# Patient Record
Sex: FEMALE | Race: WHITE | NOT HISPANIC OR LATINO | Employment: OTHER | URBAN - NONMETROPOLITAN AREA
[De-identification: names, ages, dates, MRNs, and addresses within clinical notes are randomized per-mention and may not be internally consistent; named-entity substitution may affect disease eponyms.]

---

## 2023-09-21 PROBLEM — Z79.01 ANTICOAGULATED: Status: ACTIVE | Noted: 2023-09-21

## 2023-09-21 PROBLEM — R53.83 FATIGUE: Status: ACTIVE | Noted: 2023-09-21

## 2023-09-21 PROBLEM — R00.2 PALPITATIONS: Status: ACTIVE | Noted: 2023-09-21

## 2023-09-21 PROBLEM — I48.91 ATRIAL FIBRILLATION WITH RVR (MULTI): Status: ACTIVE | Noted: 2023-09-21

## 2023-09-21 PROBLEM — R93.1 ECHOCARDIOGRAM ABNORMAL: Status: ACTIVE | Noted: 2023-09-21

## 2023-09-21 PROBLEM — Z79.899 HIGH RISK MEDICATIONS (NOT ANTICOAGULANTS) LONG-TERM USE: Status: ACTIVE | Noted: 2023-09-21

## 2023-09-21 PROBLEM — I10 HYPERTENSION, BENIGN: Status: ACTIVE | Noted: 2023-09-21

## 2023-09-21 PROBLEM — R06.09 DYSPNEA ON EXERTION: Status: ACTIVE | Noted: 2023-09-21

## 2023-09-21 PROBLEM — E78.2 MIXED HYPERLIPIDEMIA: Status: ACTIVE | Noted: 2023-09-21

## 2023-09-21 RX ORDER — NITROFURANTOIN MACROCRYSTALS 50 MG/1
1 CAPSULE ORAL DAILY
COMMUNITY

## 2023-09-21 RX ORDER — ROSUVASTATIN CALCIUM 10 MG/1
1 TABLET, COATED ORAL DAILY
COMMUNITY

## 2023-09-21 RX ORDER — CHOLECALCIFEROL (VITAMIN D3) 125 MCG
1 CAPSULE ORAL
COMMUNITY

## 2023-09-21 RX ORDER — LEVMETAMFETAMINE 50 MG
1 INHALER (EA) NASAL DAILY
COMMUNITY

## 2023-09-21 RX ORDER — DOFETILIDE 0.12 MG/1
1 CAPSULE ORAL 2 TIMES DAILY
COMMUNITY
Start: 2022-09-21 | End: 2024-02-14 | Stop reason: SDUPTHER

## 2023-09-21 RX ORDER — OMEPRAZOLE 20 MG/1
1 CAPSULE, DELAYED RELEASE ORAL
COMMUNITY

## 2023-09-21 RX ORDER — BUTYROSPERMUM PARKII(SHEA BUTTER), SIMMONDSIA CHINENSIS (JOJOBA) SEED OIL, ALOE BARBADENSIS LEAF EXTRACT .01; 1; 3.5 G/100G; G/100G; G/100G
250 LIQUID TOPICAL DAILY
COMMUNITY
End: 2024-02-14

## 2023-09-21 RX ORDER — ESTRADIOL 0.1 MG/G
CREAM VAGINAL
COMMUNITY

## 2023-10-20 ENCOUNTER — APPOINTMENT (OUTPATIENT)
Dept: CARDIOLOGY | Facility: CLINIC | Age: 83
End: 2023-10-20
Payer: COMMERCIAL

## 2023-12-11 DIAGNOSIS — I48.91 ATRIAL FIBRILLATION WITH RVR (MULTI): ICD-10-CM

## 2023-12-11 RX ORDER — DOFETILIDE 0.25 MG/1
1 CAPSULE ORAL EVERY 12 HOURS
COMMUNITY
End: 2023-12-11 | Stop reason: SDUPTHER

## 2023-12-11 NOTE — TELEPHONE ENCOUNTER
Patient called to request Tikosyn to [facility]. Feb visit pending.   Patient request Tikosyn 250  mcg one tablet two times daily. Chart has 125 mcg.     Old chart has 250 mcg listed. To Dr. Fred Schumacher MD for review.

## 2023-12-12 RX ORDER — DOFETILIDE 0.25 MG/1
250 CAPSULE ORAL EVERY 12 HOURS
Qty: 180 CAPSULE | Refills: 1 | Status: SHIPPED | OUTPATIENT
Start: 2023-12-12 | End: 2024-02-14 | Stop reason: DRUGHIGH

## 2024-01-30 DIAGNOSIS — I48.91 ATRIAL FIBRILLATION WITH RVR (MULTI): ICD-10-CM

## 2024-02-14 ENCOUNTER — OFFICE VISIT (OUTPATIENT)
Dept: CARDIOLOGY | Facility: CLINIC | Age: 84
End: 2024-02-14
Payer: COMMERCIAL

## 2024-02-14 VITALS
WEIGHT: 126 LBS | SYSTOLIC BLOOD PRESSURE: 160 MMHG | DIASTOLIC BLOOD PRESSURE: 68 MMHG | BODY MASS INDEX: 21.51 KG/M2 | HEIGHT: 64 IN | HEART RATE: 55 BPM

## 2024-02-14 DIAGNOSIS — Z79.899 HIGH RISK MEDICATIONS (NOT ANTICOAGULANTS) LONG-TERM USE: ICD-10-CM

## 2024-02-14 DIAGNOSIS — I48.91 ATRIAL FIBRILLATION WITH RVR (MULTI): Primary | ICD-10-CM

## 2024-02-14 DIAGNOSIS — E78.2 MIXED HYPERLIPIDEMIA: ICD-10-CM

## 2024-02-14 DIAGNOSIS — Z79.01 ANTICOAGULATED: ICD-10-CM

## 2024-02-14 DIAGNOSIS — I10 HYPERTENSION, BENIGN: ICD-10-CM

## 2024-02-14 PROBLEM — C91.10 CLL (CHRONIC LYMPHOCYTIC LEUKEMIA) (MULTI): Status: ACTIVE | Noted: 2024-02-14

## 2024-02-14 PROCEDURE — 3078F DIAST BP <80 MM HG: CPT | Performed by: INTERNAL MEDICINE

## 2024-02-14 PROCEDURE — 1159F MED LIST DOCD IN RCRD: CPT | Performed by: INTERNAL MEDICINE

## 2024-02-14 PROCEDURE — 1036F TOBACCO NON-USER: CPT | Performed by: INTERNAL MEDICINE

## 2024-02-14 PROCEDURE — 3077F SYST BP >= 140 MM HG: CPT | Performed by: INTERNAL MEDICINE

## 2024-02-14 PROCEDURE — 93000 ELECTROCARDIOGRAM COMPLETE: CPT | Performed by: INTERNAL MEDICINE

## 2024-02-14 PROCEDURE — 99214 OFFICE O/P EST MOD 30 MIN: CPT | Performed by: INTERNAL MEDICINE

## 2024-02-14 RX ORDER — DOFETILIDE 0.12 MG/1
125 CAPSULE ORAL 2 TIMES DAILY
Qty: 180 CAPSULE | Refills: 3 | Status: SHIPPED | OUTPATIENT
Start: 2024-02-14 | End: 2024-02-19

## 2024-02-14 NOTE — LETTER
"February 14, 2024     Nura Jett DO  [address]  [facility]  [facility] [id number]    Patient: Faye Rodriguez   YOB: 1940   Date of Visit: 2/14/2024       Dear Dr. Nura Jett DO:    Thank you for referring Faye Rodriguez to me for evaluation. Below are my notes for this consultation.  If you have questions, please do not hesitate to call me. I look forward to following your patient along with you.       Sincerely,     Fred Schumacher MD      CC: No Recipients  ______________________________________________________________________________________    Subjective   Faye Rodriguez is a 83 y.o. female       Chief Complaint    Follow-up          HPI     Patient returns in follow-up of problems as noted.  She is done well.  I cannot elicit any angina CHF or arrhythmia symptomatology and it appears that rhythm control is good.  She advises she is having a cochlear implant.  I advised her she could stop her Eliquis 5 days preop and resume it 5 days postop unless the surgeon felt otherwise.  From a cardiac standpoint she otherwise is doing well.  The reason and the rationale for restoring and maintaining sinus rhythm with dofetilide was reviewed with her and she agrees to continue as before.  She has no complaints or problems related to anticoagulant therapy and is tolerating other medical interventions for risk factors.  Review of her EKG to assess high risk medication dofetilide demonstrates an acceptable QT interval      EKG done in office today     Vitals:    02/14/24 1144   BP: 160/68   BP Location: Left arm   Patient Position: Sitting   Pulse: 55   Weight: 57.2 kg (126 lb)   Height: 1.626 m (5' 4\")        Objective   Physical Exam  Constitutional:       Appearance: Normal appearance. She is normal weight.   HENT:      Nose: Nose normal.   Neck:      Vascular: No carotid bruit.   Cardiovascular:      Rate and Rhythm: Normal rate.      Pulses: Normal pulses.      Heart " sounds: Normal heart sounds.   Pulmonary:      Effort: Pulmonary effort is normal.   Abdominal:      General: Bowel sounds are normal.      Palpations: Abdomen is soft.   Genitourinary:     Rectum: Normal.   Musculoskeletal:         General: Normal range of motion.      Cervical back: Normal range of motion.      Right lower leg: No edema.      Left lower leg: No edema.   Skin:     General: Skin is warm and dry.   Neurological:      General: No focal deficit present.      Mental Status: She is alert.   Psychiatric:         Mood and Affect: Mood normal.         Behavior: Behavior normal.         Thought Content: Thought content normal.         Judgment: Judgment normal.         Allergies  Codeine, Meperidine, and Penicillins     Current Medications    Current Outpatient Medications:   •  acetaminophen (TYLENOL EXTRA STRENGTH ORAL), Take by mouth. As needed, Disp: , Rfl:   •  apixaban (Eliquis) 2.5 mg tablet, Take 1 tablet (2.5 mg) by mouth 2 times a day., Disp: , Rfl:   •  cholecalciferol (Vitamin D-3) 125 MCG (5000 UT) capsule, Take 1 capsule (125 mcg) by mouth every 14 (fourteen) days., Disp: , Rfl:   •  cranberry fruit extract (cranberry extract) 500 mg tablet, Take 1 tablet by mouth once daily., Disp: , Rfl:   •  dofetilide (Tikosyn) 125 mcg capsule, Take 1 capsule (125 mcg) by mouth 2 times a day., Disp: , Rfl:   •  estradiol (Estrace) 0.01 % (0.1 mg/gram) vaginal cream, Apply 1 gram vaginally at night for 2 weeks, then use twice a week after, Disp: , Rfl:   •  nitrofurantoin (Macrodantin) 50 mg capsule, Take 1 capsule (50 mg) by mouth once daily., Disp: , Rfl:   •  omeprazole (PriLOSEC) 20 mg DR capsule, Take 1 capsule (20 mg) by mouth once daily in the morning. Take before meals. Do not crush or chew., Disp: , Rfl:   •  predniSONE 5 mg tablet,delayed release (DR/EC), Take 0.5 tablets by mouth. As directed, Disp: , Rfl:   •  rosuvastatin (Crestor) 10 mg tablet, Take 1 tablet (10 mg) by mouth once daily., Disp:  , Rfl:                      Assessment/Plan       1. Atrial fibrillation with RVR (CMS/HCC)  Restored and maintained in sinus rhythm with dofetilide.  Continue same    2. Anticoagulated  Well-tolerated with no bleeding complications.  The interruption and resumption of anticoagulant therapy around the time of cochlear implant was discussed.    3. Mixed hyperlipidemia  Review of treatment demonstrates no need for adjustments    4. Hypertension, benign  Review of treatment demonstrates no need for adjustments    5. High risk medications (not anticoagulants) long-term use  Acceptable QT interval on EKG today.      Scribe Attestation  By signing my name below, Verito SAXENA LPN , Patsy   attest that this documentation has been prepared under the direction and in the presence of Fred Schumacher MD.

## 2024-02-14 NOTE — PROGRESS NOTES
"Subjective   Faye Rodriguez is a 83 y.o. female       Chief Complaint    Follow-up          HPI     Patient returns in follow-up of problems as noted.  She is done well.  I cannot elicit any angina CHF or arrhythmia symptomatology and it appears that rhythm control is good.  She advises she is having a cochlear implant.  I advised her she could stop her Eliquis 5 days preop and resume it 5 days postop unless the surgeon felt otherwise.  From a cardiac standpoint she otherwise is doing well.  The reason and the rationale for restoring and maintaining sinus rhythm with dofetilide was reviewed with her and she agrees to continue as before.  She has no complaints or problems related to anticoagulant therapy and is tolerating other medical interventions for risk factors.  Review of her EKG to assess high risk medication dofetilide demonstrates an acceptable QT interval      EKG done in office today     Vitals:    02/14/24 1144   BP: 160/68   BP Location: Left arm   Patient Position: Sitting   Pulse: 55   Weight: 57.2 kg (126 lb)   Height: 1.626 m (5' 4\")        Objective   Physical Exam  Constitutional:       Appearance: Normal appearance. She is normal weight.   HENT:      Nose: Nose normal.   Neck:      Vascular: No carotid bruit.   Cardiovascular:      Rate and Rhythm: Normal rate.      Pulses: Normal pulses.      Heart sounds: Normal heart sounds.   Pulmonary:      Effort: Pulmonary effort is normal.   Abdominal:      General: Bowel sounds are normal.      Palpations: Abdomen is soft.   Genitourinary:     Rectum: Normal.   Musculoskeletal:         General: Normal range of motion.      Cervical back: Normal range of motion.      Right lower leg: No edema.      Left lower leg: No edema.   Skin:     General: Skin is warm and dry.   Neurological:      General: No focal deficit present.      Mental Status: She is alert.   Psychiatric:         Mood and Affect: Mood normal.         Behavior: Behavior normal.         " Thought Content: Thought content normal.         Judgment: Judgment normal.         Allergies  Codeine, Meperidine, and Penicillins     Current Medications    Current Outpatient Medications:     acetaminophen (TYLENOL EXTRA STRENGTH ORAL), Take by mouth. As needed, Disp: , Rfl:     apixaban (Eliquis) 2.5 mg tablet, Take 1 tablet (2.5 mg) by mouth 2 times a day., Disp: , Rfl:     cholecalciferol (Vitamin D-3) 125 MCG (5000 UT) capsule, Take 1 capsule (125 mcg) by mouth every 14 (fourteen) days., Disp: , Rfl:     cranberry fruit extract (cranberry extract) 500 mg tablet, Take 1 tablet by mouth once daily., Disp: , Rfl:     dofetilide (Tikosyn) 125 mcg capsule, Take 1 capsule (125 mcg) by mouth 2 times a day., Disp: , Rfl:     estradiol (Estrace) 0.01 % (0.1 mg/gram) vaginal cream, Apply 1 gram vaginally at night for 2 weeks, then use twice a week after, Disp: , Rfl:     nitrofurantoin (Macrodantin) 50 mg capsule, Take 1 capsule (50 mg) by mouth once daily., Disp: , Rfl:     omeprazole (PriLOSEC) 20 mg DR capsule, Take 1 capsule (20 mg) by mouth once daily in the morning. Take before meals. Do not crush or chew., Disp: , Rfl:     predniSONE 5 mg tablet,delayed release (DR/EC), Take 0.5 tablets by mouth. As directed, Disp: , Rfl:     rosuvastatin (Crestor) 10 mg tablet, Take 1 tablet (10 mg) by mouth once daily., Disp: , Rfl:                      Assessment/Plan       1. Atrial fibrillation with RVR (CMS/HCC)  Restored and maintained in sinus rhythm with dofetilide.  Continue same    2. Anticoagulated  Well-tolerated with no bleeding complications.  The interruption and resumption of anticoagulant therapy around the time of cochlear implant was discussed.    3. Mixed hyperlipidemia  Review of treatment demonstrates no need for adjustments    4. Hypertension, benign  Review of treatment demonstrates no need for adjustments    5. High risk medications (not anticoagulants) long-term use  Acceptable QT interval on EKG  today.      Scribe Attestation  By signing my name below, I, Verito MCDONALD LPN , Patsy   attest that this documentation has been prepared under the direction and in the presence of Fred Schumacher MD.

## 2024-02-19 DIAGNOSIS — I48.91 ATRIAL FIBRILLATION WITH RVR (MULTI): Primary | ICD-10-CM

## 2024-02-19 RX ORDER — DOFETILIDE 0.25 MG/1
250 CAPSULE ORAL EVERY 12 HOURS
Qty: 180 CAPSULE | Refills: 2 | Status: SHIPPED | OUTPATIENT
Start: 2024-02-19 | End: 2024-05-30 | Stop reason: DRUGHIGH

## 2024-02-19 NOTE — TELEPHONE ENCOUNTER
[facility] called back to request the 250 mcg if patient is to cont  this dose. Last rx that was picked up was for Tikosyn 250 mcg on 12/12/23 for 90 days.     [facility] filled the 125 mcg when Elena CARLOSAbefilled in October 17th . This was the only time the 125 mcg was filled. 10/31 rx was canceled for future fill.       Patient was contacted rx was ready for  on 2/14 so she picked the 125 mcg rx up.  [facility] will credit her account since it was a possible error for the dose    Pharm is requesting rx to be verified and new rx to be sent in. To Dr. Fred Schumacher MD to approve 250 mcg.

## 2024-02-19 NOTE — TELEPHONE ENCOUNTER
Patient states she went to the pharm and her rx was for Tikosyn 125 mcg BID. She has been taking Tikosyn 250 mcg BID. She has already picked it up and she has taken it home. Patients pharm is [facility].     She will speak with [facility] and inquire if she can return it and then rx can be sent for the correct dose. She will call back.    To Dr. Fred Schumacher MD

## 2024-05-29 ENCOUNTER — OFFICE VISIT (OUTPATIENT)
Dept: CARDIOLOGY | Facility: CLINIC | Age: 84
End: 2024-05-29
Payer: COMMERCIAL

## 2024-05-29 VITALS
HEART RATE: 63 BPM | WEIGHT: 120 LBS | DIASTOLIC BLOOD PRESSURE: 80 MMHG | HEIGHT: 63 IN | BODY MASS INDEX: 21.26 KG/M2 | SYSTOLIC BLOOD PRESSURE: 150 MMHG

## 2024-05-29 DIAGNOSIS — E78.2 MIXED HYPERLIPIDEMIA: ICD-10-CM

## 2024-05-29 DIAGNOSIS — I10 HYPERTENSION, BENIGN: ICD-10-CM

## 2024-05-29 DIAGNOSIS — I48.91 ATRIAL FIBRILLATION WITH RVR (MULTI): ICD-10-CM

## 2024-05-29 DIAGNOSIS — Z79.899 HIGH RISK MEDICATION USE: ICD-10-CM

## 2024-05-29 PROBLEM — Z79.01 ANTICOAGULATED: Status: RESOLVED | Noted: 2023-09-21 | Resolved: 2024-05-29

## 2024-05-29 PROCEDURE — 3077F SYST BP >= 140 MM HG: CPT | Performed by: INTERNAL MEDICINE

## 2024-05-29 PROCEDURE — 1036F TOBACCO NON-USER: CPT | Performed by: INTERNAL MEDICINE

## 2024-05-29 PROCEDURE — 99214 OFFICE O/P EST MOD 30 MIN: CPT | Performed by: INTERNAL MEDICINE

## 2024-05-29 PROCEDURE — 1159F MED LIST DOCD IN RCRD: CPT | Performed by: INTERNAL MEDICINE

## 2024-05-29 PROCEDURE — 3079F DIAST BP 80-89 MM HG: CPT | Performed by: INTERNAL MEDICINE

## 2024-05-29 PROCEDURE — 93000 ELECTROCARDIOGRAM COMPLETE: CPT | Performed by: INTERNAL MEDICINE

## 2024-05-29 NOTE — PROGRESS NOTES
"Subjective   Faye Rodriguez is a 83 y.o. female       Chief Complaint    Follow-up          HPI     Patient returns in follow-up of problems as noted.  In the interim she states she has felt well.  She denies angina CHF or arrhythmia symptomatology.  No particular complaints or concerns.  She had recent testing on her heart.  It was favorable.  Because of this we recommend no adjustments in therapy except a reduction in dofetilide dosage based on QT interval.  Next refill will be reduced to 125 mcg twice a day.    In regards to blood pressure and lipids they are adequately addressed.  Recent echo demonstrates normal systolic function and no significant structural heart disease that would suggest the need for changes in therapy.    EKG done in office today     Vitals:    05/29/24 1417   BP: 150/80   BP Location: Left arm   Patient Position: Sitting   Pulse: 63   Weight: 54.4 kg (120 lb)   Height: 1.6 m (5' 3\")        Objective   Physical Exam  Constitutional:       Appearance: Normal appearance.   HENT:      Nose: Nose normal.   Neck:      Vascular: No carotid bruit.   Cardiovascular:      Rate and Rhythm: Normal rate.      Pulses: Normal pulses.      Heart sounds: Normal heart sounds.   Pulmonary:      Effort: Pulmonary effort is normal.   Abdominal:      General: Bowel sounds are normal.      Palpations: Abdomen is soft.   Musculoskeletal:         General: Normal range of motion.      Cervical back: Normal range of motion.      Right lower leg: No edema.      Left lower leg: No edema.   Skin:     General: Skin is warm and dry.   Neurological:      General: No focal deficit present.      Mental Status: She is alert.   Psychiatric:         Mood and Affect: Mood normal.         Behavior: Behavior normal.         Thought Content: Thought content normal.         Judgment: Judgment normal.         Allergies  Bisphosphonates, Codeine, Meperidine, Penicillins, and Sulfa (sulfonamide antibiotics)     Current " Medications    Current Outpatient Medications:     acetaminophen (TYLENOL EXTRA STRENGTH ORAL), Take by mouth. As needed, Disp: , Rfl:     apixaban (Eliquis) 2.5 mg tablet, Take 1 tablet (2.5 mg) by mouth 2 times a day., Disp: 180 tablet, Rfl: 3    cholecalciferol (Vitamin D-3) 125 MCG (5000 UT) capsule, Take 1 capsule (125 mcg) by mouth every 14 (fourteen) days., Disp: , Rfl:     cranberry fruit extract (cranberry extract) 500 mg tablet, Take 1 tablet by mouth once daily., Disp: , Rfl:     dofetilide (Tikosyn) 250 mcg capsule, Take 1 capsule (250 mcg) by mouth every 12 hours., Disp: 180 capsule, Rfl: 2    estradiol (Estrace) 0.01 % (0.1 mg/gram) vaginal cream, Apply 1 gram vaginally at night for 2 weeks, then use twice a week after, Disp: , Rfl:     nitrofurantoin (Macrodantin) 50 mg capsule, Take 1 capsule (50 mg) by mouth once daily., Disp: , Rfl:     omeprazole (PriLOSEC) 20 mg DR capsule, Take 1 capsule (20 mg) by mouth once daily in the morning. Take before meals. Do not crush or chew., Disp: , Rfl:     predniSONE 5 mg tablet,delayed release (DR/EC), Take 0.5 tablets by mouth 2 times a day. As directed, Disp: , Rfl:     rosuvastatin (Crestor) 10 mg tablet, Take 1 tablet (10 mg) by mouth once daily., Disp: , Rfl:                      Assessment/Plan   1. Atrial fibrillation with RVR (Multi)  Restored and maintained in sinus rhythm.  Continue dofetilide at a lower dose      - Follow Up In Cardiology  - Follow Up In Cardiology; Future  - ECG 12 Lead    2. High risk medication use  QT interval prolonged, reduced dosage 225 mcg twice daily    3. Hypertension, benign  Review of treatment strategy demonstrates good control    4. Mixed hyperlipidemia  Review of treatment strategy demonstrates good control    5. BMI 21.0-21.9, adult  Satisfactory maintenance of BMI      Scribe Attestation  By signing my name below, Dominique SAXENA LPN  , Scribe   attest that this documentation has been prepared under the direction and in  the presence of Fred Schumacher MD.     Provider Attestation - Scribe documentation    All medical record entries made by the Scribe were at my direction and personally dictated by me. I have reviewed the chart and agree that the record accurately reflects my personal performance of the history, physical exam, discussion and plan.

## 2024-05-30 ENCOUNTER — TELEPHONE (OUTPATIENT)
Dept: CARDIOLOGY | Facility: CLINIC | Age: 84
End: 2024-05-30
Payer: COMMERCIAL

## 2024-05-30 DIAGNOSIS — I48.91 ATRIAL FIBRILLATION WITH RVR (MULTI): ICD-10-CM

## 2024-05-30 NOTE — TELEPHONE ENCOUNTER
----- Message from Fred Schumacher MD sent at 5/29/2024  4:56 PM EDT -----  Based upon patient's QT interval, please reduce dofetilide dosage to 125 mcg twice a day.  She can use up the current prescription.  Make the new prescription the lower dose.

## 2024-05-31 RX ORDER — DOFETILIDE 0.12 MG/1
125 CAPSULE ORAL EVERY 12 HOURS
Qty: 180 CAPSULE | Refills: 3 | Status: SHIPPED | OUTPATIENT
Start: 2024-05-31 | End: 2025-05-31

## 2024-07-28 PROCEDURE — 93016 CV STRESS TEST SUPVJ ONLY: CPT | Performed by: INTERNAL MEDICINE

## 2024-07-28 PROCEDURE — 78452 HT MUSCLE IMAGE SPECT MULT: CPT | Performed by: INTERNAL MEDICINE

## 2024-07-28 PROCEDURE — 93018 CV STRESS TEST I&R ONLY: CPT | Performed by: INTERNAL MEDICINE

## 2024-08-12 ENCOUNTER — TELEPHONE (OUTPATIENT)
Dept: CARDIOLOGY | Facility: CLINIC | Age: 84
End: 2024-08-12
Payer: COMMERCIAL

## 2024-08-12 NOTE — TELEPHONE ENCOUNTER
Patient phones in inquiring if she should be taking both aspirin and eliquis. She states she was recently in [facility] for a nose bleed that lasted 5.5 hours. She states they packed it while she was there and the packing was removed on Friday. Patient is concerned with being on both medications and would like to know if they're both necessary. Please advise

## 2024-09-26 ENCOUNTER — APPOINTMENT (OUTPATIENT)
Dept: CARDIOLOGY | Facility: CLINIC | Age: 84
End: 2024-09-26
Payer: COMMERCIAL

## 2024-11-04 ENCOUNTER — TELEPHONE (OUTPATIENT)
Dept: CARDIOLOGY | Facility: CLINIC | Age: 84
End: 2024-11-04
Payer: COMMERCIAL

## 2024-11-04 NOTE — TELEPHONE ENCOUNTER
Patient phoned states the last refills she got on Eliquis were over $100 she can't afford that. Inquiring if there were be a less costly alternative. Also inquiring if she would be a candidate for the watchman device. Please advise.    To Dr. Joana Umaña MD for review.

## 2024-11-05 NOTE — TELEPHONE ENCOUNTER
Patient returned call. States she has enough Eliquis to make it to her POV 2/20/2024 with Dr. Joana Umaña MD and would like to wait to discuss these details then.

## 2024-11-05 NOTE — TELEPHONE ENCOUNTER
Attempted to phone patient, she was unable to discuss at this time and will call back when she is able to talk.

## 2024-12-05 ENCOUNTER — APPOINTMENT (OUTPATIENT)
Dept: CARDIOLOGY | Facility: CLINIC | Age: 84
End: 2024-12-05
Payer: COMMERCIAL

## 2025-01-06 ENCOUNTER — TELEPHONE (OUTPATIENT)
Dept: CARDIOLOGY | Facility: CLINIC | Age: 85
End: 2025-01-06
Payer: COMMERCIAL

## 2025-01-06 NOTE — TELEPHONE ENCOUNTER
Patient leaves message stating she recently had a nosebleed which led her to the ER where it was then packed. The doctor in the ER was concerned if the patient should take eliquis 2.5 mg bid today or not. Pt inquiring.      Attempted to phone patient to find out what hospital she was at to be able to obtain records. Detailed message left.     To Clinical for pt to call back    To Dr. Joana Umaña MD for review    2

## 2025-01-06 NOTE — TELEPHONE ENCOUNTER
Pt phones back, states she went to [facility] ER last night due to nose bleed and ER doctor told her to call her cardiologist office this morning to see if she should take eliquis today. Pt states she is still experiencing some bleeding from her nose.     To Dr. Joana Umaña MD for review     Will obtain records

## 2025-01-06 NOTE — TELEPHONE ENCOUNTER
Phoned patient and discussed appointment details. Verbalized understanding. States she is already established with Dr. Causey-ENT and will call to make an appointment.

## 2025-01-23 ENCOUNTER — APPOINTMENT (OUTPATIENT)
Dept: CARDIOLOGY | Facility: CLINIC | Age: 85
End: 2025-01-23
Payer: COMMERCIAL

## 2025-01-23 VITALS
WEIGHT: 129 LBS | HEIGHT: 63 IN | DIASTOLIC BLOOD PRESSURE: 74 MMHG | HEART RATE: 60 BPM | BODY MASS INDEX: 22.86 KG/M2 | SYSTOLIC BLOOD PRESSURE: 150 MMHG

## 2025-01-23 DIAGNOSIS — I48.91 ATRIAL FIBRILLATION WITH RVR (MULTI): Primary | ICD-10-CM

## 2025-01-23 DIAGNOSIS — Z79.899 HIGH RISK MEDICATION USE: ICD-10-CM

## 2025-01-23 DIAGNOSIS — E78.2 MIXED HYPERLIPIDEMIA: ICD-10-CM

## 2025-01-23 DIAGNOSIS — R06.09 DYSPNEA ON EXERTION: ICD-10-CM

## 2025-01-23 DIAGNOSIS — I10 ESSENTIAL HYPERTENSION: ICD-10-CM

## 2025-01-23 DIAGNOSIS — R04.0 EPISTAXIS: ICD-10-CM

## 2025-01-23 DIAGNOSIS — R00.2 PALPITATIONS: ICD-10-CM

## 2025-01-23 DIAGNOSIS — C91.10 CLL (CHRONIC LYMPHOCYTIC LEUKEMIA) (MULTI): ICD-10-CM

## 2025-01-23 DIAGNOSIS — I48.19 PERSISTENT ATRIAL FIBRILLATION (MULTI): ICD-10-CM

## 2025-01-23 RX ORDER — LANOLIN ALCOHOL/MO/W.PET/CERES
1 CREAM (GRAM) TOPICAL EVERY 12 HOURS
COMMUNITY
Start: 2024-07-30

## 2025-01-23 ASSESSMENT — ENCOUNTER SYMPTOMS: LIGHT-HEADEDNESS: 1

## 2025-01-23 NOTE — PROGRESS NOTES
"Subjective   Faye Rodriguez is a 84 y.o. female       Chief Complaint    Follow-up          HPI   Patient is here for follow-up continue management for atrial fibrillation to discuss anticoagulation.  She is 84 years old with history of persistent atrial fibrillation requiring treatment with dofetilide.  She has been mainly in sinus rhythm.  She reports symptoms of fatigue and tiredness.  She had a history of CLL.  Her recent laboratory data noted and reviewed with her.  She had been recurrent episode of epistaxis and has been seen by ENT and was advised to use humidifier.  She had no coronary artery disease..  Her echocardiogram showed dilated left atrium but normal LV systolic function.  The patient since she saw her ENT has not had any recurrence of epistaxis.    Assessment    1.  Persistent atrial fibrillation maintaining sinus rhythm on low-dose dofetilide but heart rate appears on the low range.  The patient scruff mild fatigue  2.  Long-term anticoagulation with Eliquis with appropriate dose based on age and weight  3.  Recent complaint of epistaxis  4.  Essential hypertension  5.  BMI 22  6.  Mild shortness of breath  7.  CLL    Plan    1.  I reviewed with the patient results of her diagnostic testing  2.  I discussed with her anticoagulation option including aspirin versus Watchman device versus continuation of Eliquis following lengthy discussion the patient elected to continue Eliquis she will notify me if she had a recurrence and we will consider referral for Watchman device  Her previous stress test was normal.  Her echocardiogram     Review of Systems   Neurological:  Positive for light-headedness.   All other systems reviewed and are negative.           Vitals:    01/23/25 1410 01/23/25 1420   BP: 142/82 150/74   BP Location: Left arm Left arm   Patient Position: Sitting Standing   Pulse: 60    Weight: 58.5 kg (129 lb)    Height: 1.6 m (5' 3\")       EKG done in office today    Objective   Physical " Exam  Constitutional:       Appearance: Normal appearance.   HENT:      Nose: Nose normal.   Neck:      Vascular: No carotid bruit.   Cardiovascular:      Rate and Rhythm: Normal rate.      Pulses: Normal pulses.      Heart sounds: Normal heart sounds.   Pulmonary:      Effort: Pulmonary effort is normal.   Abdominal:      General: Bowel sounds are normal.      Palpations: Abdomen is soft.   Musculoskeletal:         General: Normal range of motion.      Cervical back: Normal range of motion.      Right lower leg: No edema.      Left lower leg: No edema.   Skin:     General: Skin is warm and dry.   Neurological:      General: No focal deficit present.      Mental Status: She is alert.   Psychiatric:         Mood and Affect: Mood normal.         Behavior: Behavior normal.         Thought Content: Thought content normal.         Judgment: Judgment normal.         Allergies  Bisphosphonates, Codeine, Meperidine, Penicillins, and Sulfa (sulfonamide antibiotics)     Current Medications    Current Outpatient Medications:     acetaminophen (TYLENOL EXTRA STRENGTH ORAL), Take by mouth. As needed, Disp: , Rfl:     apixaban (Eliquis) 2.5 mg tablet, Take 1 tablet (2.5 mg) by mouth 2 times a day., Disp: 180 tablet, Rfl: 3    cholecalciferol (Vitamin D-3) 125 MCG (5000 UT) capsule, Take 1 capsule (125 mcg) by mouth every 14 (fourteen) days., Disp: , Rfl:     cranberry fruit extract (cranberry extract) 500 mg tablet, Take 1 tablet by mouth once daily., Disp: , Rfl:     dofetilide (Tikosyn) 125 mcg capsule, Take 1 capsule (125 mcg) by mouth every 12 hours., Disp: 180 capsule, Rfl: 3    magnesium oxide (Mag-Ox) 400 mg (241.3 mg magnesium) tablet, Take 1 tablet (400 mg) by mouth every 12 hours., Disp: , Rfl:     nitrofurantoin (Macrodantin) 50 mg capsule, Take 1 capsule (50 mg) by mouth once daily., Disp: , Rfl:     omeprazole (PriLOSEC) 20 mg DR capsule, Take 1 capsule (20 mg) by mouth once daily in the morning. Take before meals.  Do not crush or chew., Disp: , Rfl:     predniSONE 5 mg tablet,delayed release (DR/EC), Take 0.5 tablets by mouth 2 times a day. As directed, Disp: , Rfl:     rosuvastatin (Crestor) 10 mg tablet, Take 1 tablet (10 mg) by mouth once daily., Disp: , Rfl:                      Assessment/Plan   1. Atrial fibrillation with RVR (Multi)  Follow Up In Cardiology      2. Persistent atrial fibrillation (Multi)  ECG 12 Lead    Holter Or Event Cardiac Monitor      3. High risk medication use        4. Palpitations  Follow Up In Cardiology      5. Mixed hyperlipidemia        6. Essential hypertension        7. Dyspnea on exertion        8. BMI 22.0-22.9, adult        9. CLL (chronic lymphocytic leukemia) (Multi)        10. Epistaxis                 Scribe Attestation  By signing my name below, IDominique LPN  , Scribe   attest that this documentation has been prepared under the direction and in the presence of Joana Umaña MD.     Provider Attestation - Scribe documentation    All medical record entries made by the Scribe were at my direction and personally dictated by me. I have reviewed the chart and agree that the record accurately reflects my personal performance of the history, physical exam, discussion and plan.

## 2025-01-23 NOTE — LETTER
January 23, 2025     Nura Jett DO  [address]  [facility]  [facility] [address]    Patient: Faye Rodriguez   YOB: 1940   Date of Visit: 1/23/2025       Dear Dr. Nura Jett DO:    Thank you for referring Faye Rodriguez to me for evaluation. Below are my notes for this consultation.  If you have questions, please do not hesitate to call me. I look forward to following your patient along with you.       Sincerely,     Joana Umaña MD      CC: No Recipients  ______________________________________________________________________________________    Subjective   Faye Rodriguez is a 84 y.o. female       Chief Complaint    Follow-up          HPI   Patient is here for follow-up continue management for atrial fibrillation to discuss anticoagulation.  She is 84 years old with history of persistent atrial fibrillation requiring treatment with dofetilide.  She has been mainly in sinus rhythm.  She reports symptoms of fatigue and tiredness.  She had a history of CLL.  Her recent laboratory data noted and reviewed with her.  She had been recurrent episode of epistaxis and has been seen by ENT and was advised to use humidifier.  She had no coronary artery disease..  Her echocardiogram showed dilated left atrium but normal LV systolic function.  The patient since she saw her ENT has not had any recurrence of epistaxis.    Assessment    1.  Persistent atrial fibrillation maintaining sinus rhythm on low-dose dofetilide but heart rate appears on the low range.  The patient scruff mild fatigue  2.  Long-term anticoagulation with Eliquis with appropriate dose based on age and weight  3.  Recent complaint of epistaxis  4.  Essential hypertension  5.  BMI 22  6.  Mild shortness of breath  7.  CLL    Plan    1.  I reviewed with the patient results of her diagnostic testing  2.  I discussed with her anticoagulation option including aspirin versus Watchman device versus continuation  "of Eliquis following lengthy discussion the patient elected to continue Eliquis she will notify me if she had a recurrence and we will consider referral for Watchman device  Her previous stress test was normal.  Her echocardiogram     Review of Systems   Neurological:  Positive for light-headedness.   All other systems reviewed and are negative.           Vitals:    01/23/25 1410 01/23/25 1420   BP: 142/82 150/74   BP Location: Left arm Left arm   Patient Position: Sitting Standing   Pulse: 60    Weight: 58.5 kg (129 lb)    Height: 1.6 m (5' 3\")       EKG done in office today    Objective   Physical Exam  Constitutional:       Appearance: Normal appearance.   HENT:      Nose: Nose normal.   Neck:      Vascular: No carotid bruit.   Cardiovascular:      Rate and Rhythm: Normal rate.      Pulses: Normal pulses.      Heart sounds: Normal heart sounds.   Pulmonary:      Effort: Pulmonary effort is normal.   Abdominal:      General: Bowel sounds are normal.      Palpations: Abdomen is soft.   Musculoskeletal:         General: Normal range of motion.      Cervical back: Normal range of motion.      Right lower leg: No edema.      Left lower leg: No edema.   Skin:     General: Skin is warm and dry.   Neurological:      General: No focal deficit present.      Mental Status: She is alert.   Psychiatric:         Mood and Affect: Mood normal.         Behavior: Behavior normal.         Thought Content: Thought content normal.         Judgment: Judgment normal.         Allergies  Bisphosphonates, Codeine, Meperidine, Penicillins, and Sulfa (sulfonamide antibiotics)     Current Medications    Current Outpatient Medications:   •  acetaminophen (TYLENOL EXTRA STRENGTH ORAL), Take by mouth. As needed, Disp: , Rfl:   •  apixaban (Eliquis) 2.5 mg tablet, Take 1 tablet (2.5 mg) by mouth 2 times a day., Disp: 180 tablet, Rfl: 3  •  cholecalciferol (Vitamin D-3) 125 MCG (5000 UT) capsule, Take 1 capsule (125 mcg) by mouth every 14 " (fourteen) days., Disp: , Rfl:   •  cranberry fruit extract (cranberry extract) 500 mg tablet, Take 1 tablet by mouth once daily., Disp: , Rfl:   •  dofetilide (Tikosyn) 125 mcg capsule, Take 1 capsule (125 mcg) by mouth every 12 hours., Disp: 180 capsule, Rfl: 3  •  magnesium oxide (Mag-Ox) 400 mg (241.3 mg magnesium) tablet, Take 1 tablet (400 mg) by mouth every 12 hours., Disp: , Rfl:   •  nitrofurantoin (Macrodantin) 50 mg capsule, Take 1 capsule (50 mg) by mouth once daily., Disp: , Rfl:   •  omeprazole (PriLOSEC) 20 mg DR capsule, Take 1 capsule (20 mg) by mouth once daily in the morning. Take before meals. Do not crush or chew., Disp: , Rfl:   •  predniSONE 5 mg tablet,delayed release (DR/EC), Take 0.5 tablets by mouth 2 times a day. As directed, Disp: , Rfl:   •  rosuvastatin (Crestor) 10 mg tablet, Take 1 tablet (10 mg) by mouth once daily., Disp: , Rfl:                      Assessment/Plan   1. Atrial fibrillation with RVR (Multi)  Follow Up In Cardiology      2. Persistent atrial fibrillation (Multi)  ECG 12 Lead    Holter Or Event Cardiac Monitor      3. High risk medication use        4. Palpitations  Follow Up In Cardiology      5. Mixed hyperlipidemia        6. Essential hypertension        7. Dyspnea on exertion        8. BMI 22.0-22.9, adult        9. CLL (chronic lymphocytic leukemia) (Multi)        10. Epistaxis                 Scribe Attestation  By signing my name below, IDominique LPN, Scribe   attest that this documentation has been prepared under the direction and in the presence of Joana Umaña MD.     Provider Attestation - Scribe documentation    All medical record entries made by the Scribe were at my direction and personally dictated by me. I have reviewed the chart and agree that the record accurately reflects my personal performance of the history, physical exam, discussion and plan.

## 2025-01-30 ENCOUNTER — APPOINTMENT (OUTPATIENT)
Dept: CARDIOLOGY | Facility: CLINIC | Age: 85
End: 2025-01-30
Payer: MEDICARE

## 2025-01-30 DIAGNOSIS — I48.19 PERSISTENT ATRIAL FIBRILLATION (MULTI): ICD-10-CM

## 2025-02-05 ENCOUNTER — TELEPHONE (OUTPATIENT)
Dept: CARDIOLOGY | Facility: CLINIC | Age: 85
End: 2025-02-05

## 2025-02-05 PROCEDURE — 93227 XTRNL ECG REC<48 HR R&I: CPT | Performed by: INTERNAL MEDICINE

## 2025-02-05 NOTE — TELEPHONE ENCOUNTER
Result Communication    Resulted Orders   Holter Or Event Cardiac Monitor    Narrative    This is a 24-hour Holter monitor    Indication is atrial fibrillation    Procedure: Patient underwent 24-hour Holter monitor.  Baseline rhythm is   normal sinus rhythm with average heart of 60 bpm ranging from 43 to 110   bpm.  There were total of 5 isolated ventricular ectopic beat.  There were   total of 16,971 isolated supraventricular ectopic beat few brief run of   atrial tachycardia were noted.  1 episode of shortness of breath occurred   during baseline rhythm of sinus with frequent PACs    Conclusion    1.  Normal sinus rhythm  2.  Rare isolated ventricular ectopic beat  3.  Frequent isolated supraventricular ectopic beat with few brief run of   atrial tachycardia  4.  No clear atrial fibrillation or bradycardic episode         4:35 PM      Results were successfully communicated with the patient and they acknowledged their understanding.

## 2025-02-05 NOTE — TELEPHONE ENCOUNTER
----- Message from Joana Umaña sent at 2/5/2025  4:26 PM EST -----  Advise Holter showed no A-fib but lots of extra beats no new order  ----- Message -----  From: Joana Umaña MD  Sent: 2/5/2025   4:11 PM EST  To: Joana Umaña MD

## 2025-02-20 ENCOUNTER — APPOINTMENT (OUTPATIENT)
Dept: CARDIOLOGY | Facility: CLINIC | Age: 85
End: 2025-02-20
Payer: COMMERCIAL

## 2025-05-05 DIAGNOSIS — I48.91 ATRIAL FIBRILLATION WITH RVR (MULTI): ICD-10-CM

## 2025-07-25 PROCEDURE — 93306 TTE W/DOPPLER COMPLETE: CPT | Performed by: INTERNAL MEDICINE

## 2025-08-29 ENCOUNTER — APPOINTMENT (OUTPATIENT)
Dept: CARDIOLOGY | Facility: CLINIC | Age: 85
End: 2025-08-29
Payer: MEDICARE

## 2025-08-29 VITALS
WEIGHT: 121 LBS | BODY MASS INDEX: 21.44 KG/M2 | HEIGHT: 63 IN | DIASTOLIC BLOOD PRESSURE: 82 MMHG | HEART RATE: 77 BPM | SYSTOLIC BLOOD PRESSURE: 122 MMHG

## 2025-08-29 DIAGNOSIS — I10 HYPERTENSION, BENIGN: ICD-10-CM

## 2025-08-29 DIAGNOSIS — R00.2 PALPITATIONS: ICD-10-CM

## 2025-08-29 DIAGNOSIS — I48.91 ATRIAL FIBRILLATION WITH RVR (MULTI): ICD-10-CM

## 2025-08-29 DIAGNOSIS — E78.2 MIXED HYPERLIPIDEMIA: ICD-10-CM

## 2025-08-29 DIAGNOSIS — I35.0 NONRHEUMATIC AORTIC (VALVE) STENOSIS: ICD-10-CM

## 2025-08-29 DIAGNOSIS — I48.19 ATRIAL FIBRILLATION, PERSISTENT (MULTI): Primary | ICD-10-CM

## 2025-08-29 DIAGNOSIS — R06.09 DYSPNEA ON EXERTION: ICD-10-CM

## 2025-08-29 DIAGNOSIS — I27.20 PULMONARY HYPERTENSION, UNSPECIFIED (MULTI): ICD-10-CM

## 2025-08-29 PROBLEM — R93.1 ECHOCARDIOGRAM ABNORMAL: Status: RESOLVED | Noted: 2023-09-21 | Resolved: 2025-08-29

## 2025-08-29 RX ORDER — OMEPRAZOLE 40 MG/1
40 CAPSULE, DELAYED RELEASE ORAL DAILY
COMMUNITY
Start: 2025-05-25

## 2025-08-29 RX ORDER — PREDNISONE 2.5 MG/1
5 TABLET ORAL DAILY
COMMUNITY
Start: 2025-08-28

## 2025-08-29 ASSESSMENT — ENCOUNTER SYMPTOMS
LIGHT-HEADEDNESS: 1
DYSPNEA ON EXERTION: 1
PALPITATIONS: 1

## 2026-04-14 ENCOUNTER — APPOINTMENT (OUTPATIENT)
Dept: CARDIOLOGY | Facility: CLINIC | Age: 86
End: 2026-04-14
Payer: MEDICARE